# Patient Record
Sex: FEMALE | Race: BLACK OR AFRICAN AMERICAN | Employment: FULL TIME | URBAN - METROPOLITAN AREA
[De-identification: names, ages, dates, MRNs, and addresses within clinical notes are randomized per-mention and may not be internally consistent; named-entity substitution may affect disease eponyms.]

---

## 2020-10-04 ENCOUNTER — HOSPITAL ENCOUNTER (INPATIENT)
Age: 30
LOS: 2 days | Discharge: HOME | DRG: 812 | End: 2020-10-06
Attending: EMERGENCY MEDICINE | Admitting: INTERNAL MEDICINE

## 2020-10-04 ENCOUNTER — APPOINTMENT (OUTPATIENT)
Dept: CT IMAGING | Age: 30
DRG: 812 | End: 2020-10-04

## 2020-10-04 PROBLEM — D64.9 ACUTE ANEMIA: Status: ACTIVE | Noted: 2020-10-04

## 2020-10-04 LAB
A/G RATIO: 1.7 (ref 1.1–2.2)
ALBUMIN SERPL-MCNC: 4.3 G/DL (ref 3.4–5)
ALP BLD-CCNC: 52 U/L (ref 40–129)
ALT SERPL-CCNC: 7 U/L (ref 10–40)
AMORPHOUS: ABNORMAL /HPF
ANION GAP SERPL CALCULATED.3IONS-SCNC: 10 MMOL/L (ref 3–16)
ANISOCYTOSIS: ABNORMAL
AST SERPL-CCNC: 13 U/L (ref 15–37)
BACTERIA: ABNORMAL /HPF
BASOPHILS ABSOLUTE: 0 K/UL (ref 0–0.2)
BASOPHILS RELATIVE PERCENT: 0 %
BILIRUB SERPL-MCNC: <0.2 MG/DL (ref 0–1)
BILIRUBIN URINE: NEGATIVE
BLOOD, URINE: ABNORMAL
BUN BLDV-MCNC: 13 MG/DL (ref 7–20)
CALCIUM SERPL-MCNC: 8.8 MG/DL (ref 8.3–10.6)
CHLORIDE BLD-SCNC: 105 MMOL/L (ref 99–110)
CLARITY: ABNORMAL
CO2: 22 MMOL/L (ref 21–32)
COLOR: ABNORMAL
CREAT SERPL-MCNC: 0.8 MG/DL (ref 0.6–1.1)
EOSINOPHILS ABSOLUTE: 0 K/UL (ref 0–0.6)
EOSINOPHILS RELATIVE PERCENT: 0 %
EPITHELIAL CELLS, UA: ABNORMAL /HPF (ref 0–5)
GFR AFRICAN AMERICAN: >60
GFR NON-AFRICAN AMERICAN: >60
GLOBULIN: 2.5 G/DL
GLUCOSE BLD-MCNC: 99 MG/DL (ref 70–99)
GLUCOSE URINE: NEGATIVE MG/DL
HCG(URINE) PREGNANCY TEST: NEGATIVE
HCT VFR BLD CALC: 16 % (ref 36–48)
HCT VFR BLD CALC: 16.2 % (ref 36–48)
HEMATOLOGY PATH CONSULT: YES
HEMOGLOBIN: 4.9 G/DL (ref 12–16)
HEMOGLOBIN: 4.9 G/DL (ref 12–16)
HYPOCHROMIA: ABNORMAL
KETONES, URINE: NEGATIVE MG/DL
LEUKOCYTE ESTERASE, URINE: ABNORMAL
LYMPHOCYTES ABSOLUTE: 2.1 K/UL (ref 1–5.1)
LYMPHOCYTES RELATIVE PERCENT: 29 %
MACROCYTES: ABNORMAL
MCH RBC QN AUTO: 19.8 PG (ref 26–34)
MCHC RBC AUTO-ENTMCNC: 30.1 G/DL (ref 31–36)
MCV RBC AUTO: 65.9 FL (ref 80–100)
MICROCYTES: ABNORMAL
MICROSCOPIC EXAMINATION: YES
MONOCYTES ABSOLUTE: 0.6 K/UL (ref 0–1.3)
MONOCYTES RELATIVE PERCENT: 8 %
NEUTROPHILS ABSOLUTE: 4.7 K/UL (ref 1.7–7.7)
NEUTROPHILS RELATIVE PERCENT: 63 %
NITRITE, URINE: POSITIVE
PDW BLD-RTO: 26.1 % (ref 12.4–15.4)
PH UA: 5 (ref 5–8)
PLATELET # BLD: 326 K/UL (ref 135–450)
PLATELET SLIDE REVIEW: ADEQUATE
PMV BLD AUTO: 9.2 FL (ref 5–10.5)
POIKILOCYTES: ABNORMAL
POTASSIUM REFLEX MAGNESIUM: 3.9 MMOL/L (ref 3.5–5.1)
PROTEIN UA: 100 MG/DL
RBC # BLD: 2.45 M/UL (ref 4–5.2)
RBC UA: >100 /HPF (ref 0–4)
SCHISTOCYTES: ABNORMAL
SLIDE REVIEW: ABNORMAL
SODIUM BLD-SCNC: 137 MMOL/L (ref 136–145)
SPECIFIC GRAVITY UA: 1.01 (ref 1–1.03)
STOMATOCYTES: ABNORMAL
TARGET CELLS: ABNORMAL
TOTAL PROTEIN: 6.8 G/DL (ref 6.4–8.2)
URINE REFLEX TO CULTURE: YES
URINE TYPE: ABNORMAL
UROBILINOGEN, URINE: 0.2 E.U./DL
WBC # BLD: 7.4 K/UL (ref 4–11)
WBC UA: ABNORMAL /HPF (ref 0–5)

## 2020-10-04 PROCEDURE — 84703 CHORIONIC GONADOTROPIN ASSAY: CPT

## 2020-10-04 PROCEDURE — 82728 ASSAY OF FERRITIN: CPT

## 2020-10-04 PROCEDURE — 87086 URINE CULTURE/COLONY COUNT: CPT

## 2020-10-04 PROCEDURE — 85014 HEMATOCRIT: CPT

## 2020-10-04 PROCEDURE — 86923 COMPATIBILITY TEST ELECTRIC: CPT

## 2020-10-04 PROCEDURE — 86850 RBC ANTIBODY SCREEN: CPT

## 2020-10-04 PROCEDURE — 84443 ASSAY THYROID STIM HORMONE: CPT

## 2020-10-04 PROCEDURE — 86901 BLOOD TYPING SEROLOGIC RH(D): CPT

## 2020-10-04 PROCEDURE — 85025 COMPLETE CBC W/AUTO DIFF WBC: CPT

## 2020-10-04 PROCEDURE — 83540 ASSAY OF IRON: CPT

## 2020-10-04 PROCEDURE — 81001 URINALYSIS AUTO W/SCOPE: CPT

## 2020-10-04 PROCEDURE — 1200000000 HC SEMI PRIVATE

## 2020-10-04 PROCEDURE — 85018 HEMOGLOBIN: CPT

## 2020-10-04 PROCEDURE — 36430 TRANSFUSION BLD/BLD COMPNT: CPT

## 2020-10-04 PROCEDURE — 74176 CT ABD & PELVIS W/O CONTRAST: CPT

## 2020-10-04 PROCEDURE — 83550 IRON BINDING TEST: CPT

## 2020-10-04 PROCEDURE — 86900 BLOOD TYPING SEROLOGIC ABO: CPT

## 2020-10-04 PROCEDURE — 2580000003 HC RX 258: Performed by: EMERGENCY MEDICINE

## 2020-10-04 PROCEDURE — 36415 COLL VENOUS BLD VENIPUNCTURE: CPT

## 2020-10-04 PROCEDURE — 99285 EMERGENCY DEPT VISIT HI MDM: CPT

## 2020-10-04 PROCEDURE — 93005 ELECTROCARDIOGRAM TRACING: CPT | Performed by: EMERGENCY MEDICINE

## 2020-10-04 PROCEDURE — 6360000002 HC RX W HCPCS: Performed by: EMERGENCY MEDICINE

## 2020-10-04 PROCEDURE — 80053 COMPREHEN METABOLIC PANEL: CPT

## 2020-10-04 PROCEDURE — P9016 RBC LEUKOCYTES REDUCED: HCPCS

## 2020-10-04 RX ORDER — POLYETHYLENE GLYCOL 3350 17 G/17G
17 POWDER, FOR SOLUTION ORAL DAILY PRN
Status: DISCONTINUED | OUTPATIENT
Start: 2020-10-04 | End: 2020-10-06 | Stop reason: HOSPADM

## 2020-10-04 RX ORDER — 0.9 % SODIUM CHLORIDE 0.9 %
250 INTRAVENOUS SOLUTION INTRAVENOUS ONCE
Status: COMPLETED | OUTPATIENT
Start: 2020-10-04 | End: 2020-10-05

## 2020-10-04 RX ORDER — SODIUM CHLORIDE 9 MG/ML
INJECTION, SOLUTION INTRAVENOUS CONTINUOUS
Status: ACTIVE | OUTPATIENT
Start: 2020-10-04 | End: 2020-10-05

## 2020-10-04 RX ORDER — SODIUM CHLORIDE 0.9 % (FLUSH) 0.9 %
10 SYRINGE (ML) INJECTION EVERY 12 HOURS SCHEDULED
Status: DISCONTINUED | OUTPATIENT
Start: 2020-10-04 | End: 2020-10-06 | Stop reason: HOSPADM

## 2020-10-04 RX ORDER — SODIUM CHLORIDE 0.9 % (FLUSH) 0.9 %
10 SYRINGE (ML) INJECTION PRN
Status: DISCONTINUED | OUTPATIENT
Start: 2020-10-04 | End: 2020-10-06 | Stop reason: HOSPADM

## 2020-10-04 RX ORDER — ONDANSETRON 2 MG/ML
4 INJECTION INTRAMUSCULAR; INTRAVENOUS EVERY 6 HOURS PRN
Status: DISCONTINUED | OUTPATIENT
Start: 2020-10-04 | End: 2020-10-06 | Stop reason: HOSPADM

## 2020-10-04 RX ORDER — ACETAMINOPHEN 325 MG/1
650 TABLET ORAL EVERY 6 HOURS PRN
Status: DISCONTINUED | OUTPATIENT
Start: 2020-10-04 | End: 2020-10-06 | Stop reason: HOSPADM

## 2020-10-04 RX ORDER — ACETAMINOPHEN 650 MG/1
650 SUPPOSITORY RECTAL EVERY 6 HOURS PRN
Status: DISCONTINUED | OUTPATIENT
Start: 2020-10-04 | End: 2020-10-06 | Stop reason: HOSPADM

## 2020-10-04 RX ORDER — 0.9 % SODIUM CHLORIDE 0.9 %
1000 INTRAVENOUS SOLUTION INTRAVENOUS ONCE
Status: COMPLETED | OUTPATIENT
Start: 2020-10-04 | End: 2020-10-04

## 2020-10-04 RX ORDER — PROMETHAZINE HYDROCHLORIDE 12.5 MG/1
12.5 TABLET ORAL EVERY 6 HOURS PRN
Status: DISCONTINUED | OUTPATIENT
Start: 2020-10-04 | End: 2020-10-06 | Stop reason: HOSPADM

## 2020-10-04 RX ADMIN — SODIUM CHLORIDE 1000 ML: 0.9 INJECTION, SOLUTION INTRAVENOUS at 19:30

## 2020-10-04 RX ADMIN — DEXTROSE MONOHYDRATE 1 G: 5 INJECTION INTRAVENOUS at 21:15

## 2020-10-04 ASSESSMENT — ENCOUNTER SYMPTOMS
WHEEZING: 0
STRIDOR: 0
TROUBLE SWALLOWING: 0
ABDOMINAL PAIN: 0
VOICE CHANGE: 0
NAUSEA: 0
COLOR CHANGE: 0
VOMITING: 0
FACIAL SWELLING: 0
SHORTNESS OF BREATH: 0

## 2020-10-04 ASSESSMENT — PAIN SCALES - GENERAL: PAINLEVEL_OUTOF10: 0

## 2020-10-04 ASSESSMENT — PAIN DESCRIPTION - LOCATION: LOCATION: BACK

## 2020-10-04 ASSESSMENT — PAIN DESCRIPTION - ORIENTATION: ORIENTATION: RIGHT;LEFT;LOWER;MID

## 2020-10-04 NOTE — ED PROVIDER NOTES
[address] [facility]  eMERGENCY dEPARTMENT eNCOUnter      Pt Name: Selina Casey  MRN: [id number]  [place] 10/26/1992  Date of evaluation: 10/4/2020  Provider: Billie Samuels MD    CHIEF COMPLAINT       Chief Complaint   Patient presents with    Dizziness     c/o Feeling dizzy all day at work. States usually they leave but this one is sticking around. Has been happening for past week.  was injured at work awhile ago. Was seen at Urgent Care, was treated with ABX but states now is urinating blood. HISTORY OF PRESENT ILLNESS   (Location/Symptom, Timing/Onset, Context/Setting, Quality, Duration, Modifying Factors, Severity)  Note limiting factors. Selina Casey is a 32 y.o. female denies any known medical conditions and not taking any medications who presents due to lightheadedness intermittently every day for the past week. 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 She reports prior to today she would feel very lightheaded and would have decreased vision and have to sit down when she went long periods without eating. She reports that prior to today when she did eat it significantly helped her symptoms. However today she had the same symptoms but when she ate it did not help her symptoms so she decided to come to the emergency department for further evaluation as her symptoms were not relieved. She reports the a couple weeks ago she was cooking a heavy object at work and ever since then has been peeing blood. She also reports some right flank pain. Pain is intermittent and waxes and wanes. Sports lifting moving worsens the pain and nothing improves it. She reports she was treated with an antibiotic for this but does not know what it was. She reports her symptoms are severe, intermittent, and wax and wane.   She denies any vertigo or room spinning, loss of consciousness, vomiting, confusion, or altered mental Drug use: Not on file    Sexual activity: Not on file   Lifestyle    Physical activity     Days per week: Not on file     Minutes per session: Not on file    Stress: Not on file   Relationships    Social connections     Talks on phone: Not on file     Gets together: Not on file     Attends Alevism service: Not on file     Active member of club or organization: Not on file     Attends meetings of clubs or organizations: Not on file     Relationship status: Not on file    Intimate partner violence     Fear of current or ex partner: Not on file     Emotionally abused: Not on file     Physically abused: Not on file     Forced sexual activity: Not on file   Other Topics Concern    Not on file   Social History Narrative    Not on file         PHYSICAL EXAM    (up to 7 for level 4, 8 or more for level 5)     ED Triage Vitals [10/04/20 1907]   BP Temp Temp Source Pulse Resp SpO2 Height Weight   116/68 98.7 °F (37.1 °C) Oral 90 14 100 % 5' 9\" (1.753 m) 187 lb 4.8 oz (85 kg)       Physical Exam  Vitals signs and nursing note reviewed. Constitutional:       Appearance: She is well-developed. She is not diaphoretic. Comments: Pleasant and cooperative. Nontoxic-appearing. In no acute distress. HENT:      Head: Normocephalic and atraumatic. Right Ear: External ear normal.      Left Ear: External ear normal.   Eyes:      Extraocular Movements: Extraocular movements intact. Conjunctiva/sclera: Conjunctivae normal.      Pupils: Pupils are equal, round, and reactive to light. Neck:      Musculoskeletal: Neck supple. Vascular: No JVD. Trachea: No tracheal deviation. Cardiovascular:      Rate and Rhythm: Normal rate. Pulmonary:      Effort: Pulmonary effort is normal. No respiratory distress. Breath sounds: Normal breath sounds. No wheezing. Abdominal:      General: There is no distension. Palpations: Abdomen is soft. Tenderness: There is no abdominal tenderness.  There is right CVA tenderness. There is no left CVA tenderness, guarding or rebound. Comments: Abdomen soft. No anterior abdominal tenderness to palpation. Mild right-sided CVA tenderness to percussion. Musculoskeletal: Normal range of motion. General: No tenderness or deformity. Skin:     General: Skin is warm and dry. Neurological:      General: No focal deficit present. Mental Status: She is alert and oriented to person, place, and time. Cranial Nerves: No cranial nerve deficit. Comments: Normal speech. Normal mental status. Sensation intact in all CN V distributions of the face bilaterally. No facial droop. 5/5 strength in all 4 extremities. Normal gait. Normal sensation equal in all 4 extremities. Negative romberg. Normal finger to nose and heel to shin. DIAGNOSTIC RESULTS     EKG:All EKG's are interpreted by the Emergency Department Physician who either signs or Co-signs this chart in the absence of a cardiologist.    The Ekg interpreted by me shows  normal sinus rhythm with a rate of 83  Axis is   Normal  QTc is  432ms  Intervals and Durations are unremarkable. ST Segments: normal  No previous EKG available for comparison      RADIOLOGY:     Interpretation per the Radiologist below, if available at the time of this note:    CT ABDOMEN PELVIS WO CONTRAST Additional Contrast? None   Final Result      1. Small fluid lower pelvis which may reflect physiologic fluid or may reflect a ruptured ovarian cyst in the proper clinical setting. 2. No ureterolithiasis or hydronephrosis. 3. Small fat-containing umbilical hernia.                   ED BEDSIDE ULTRASOUND:   Performed by ED Physician - none    LABS:  Labs Reviewed   URINE RT REFLEX TO CULTURE - Abnormal; Notable for the following components:       Result Value    Color, UA RED (*)     Clarity, UA CLOUDY (*)     Blood, Urine LARGE (*)     Protein,  (*)     Nitrite, Urine POSITIVE (*)     Leukocyte Esterase, Urine TRACE (*)     All other components within normal limits    Narrative:     Performed at:  [facility]  [facility]   Phone [phone]   MICROSCOPIC URINALYSIS - Abnormal; Notable for the following components:    WBC, UA 10-20 (*)     RBC, UA >100 (*)     Epithelial Cells, UA 6-10 (*)     Bacteria, UA 3+ (*)     All other components within normal limits    Narrative:     Performed at:  [facility]  [facility] [place] [address]   Phone [phone]   CBC WITH AUTO DIFFERENTIAL - Abnormal; Notable for the following components:    RBC 2.45 (*)     Hemoglobin 4.9 (*)     Hematocrit 16.2 (*)     MCV 65.9 (*)     MCH 19.8 (*)     MCHC 30.1 (*)     RDW 26.1 (*)     All other components within normal limits    Narrative:     Donald Diana tel. [phone],  Hematology results called to and read back by Onur Patel RN, 10/04/2020  20:22, by [facility]  Performed at:  [facility]  [facility] [place]   Phone [phone] METABOLIC PANEL W/ REFLEX TO MG FOR LOW K - Abnormal; Notable for the following components:    ALT 7 (*)     AST 13 (*)     All other components within normal limits    Narrative:     Performed at:  [facility]  [facility] [place]   Phone [phone]   CULTURE, URINE   PREGNANCY, URINE    Narrative:     Performed at:  [facility] [place]   Phone [phone]       All otherlabs were within normal range or not returned as of this dictation.     EMERGENCY DEPARTMENT COURSE and DIFFERENTIAL DIAGNOSIS/MDM:   Vitals:    Vitals:    10/04/20 1907 10/04/20 2015   BP: 116/68    Pulse: 90 92   Resp: 14 14   Temp: 98.7 °F (37.1 °C)    TempSrc: Oral    SpO2: 100%    Weight: 187 lb 4.8 oz studies, discussions with consultants, ordering and review of radiographic studies, re-evaluation of patient's condition, examination of patient and obtaining history from patient or surrogate        FINAL IMPRESSION      1. Anemia, unspecified type    2. Lightheadedness          DISPOSITION/PLAN   DISPOSITION  Admit           (Please note that portions of this note were completed with a voice recognition program.  Efforts were made to edit the dictations but occasionally words aremis-transcribed. )    Demarco Rajan MD (electronically signed)  Attending Emergency Physician           Demarco Rajan MD  10/04/20 9157

## 2020-10-05 ENCOUNTER — APPOINTMENT (OUTPATIENT)
Dept: CT IMAGING | Age: 30
DRG: 812 | End: 2020-10-05

## 2020-10-05 LAB
ALBUMIN SERPL-MCNC: 3.6 G/DL (ref 3.4–5)
ANION GAP SERPL CALCULATED.3IONS-SCNC: 7 MMOL/L (ref 3–16)
APTT: 28.3 SEC (ref 24.2–36.2)
BLOOD BANK DISPENSE STATUS: NORMAL
BLOOD BANK DISPENSE STATUS: NORMAL
BLOOD BANK PRODUCT CODE: NORMAL
BLOOD BANK PRODUCT CODE: NORMAL
BPU ID: NORMAL
BPU ID: NORMAL
BUN BLDV-MCNC: 9 MG/DL (ref 7–20)
C3 COMPLEMENT: 108.7 MG/DL (ref 90–180)
C4 COMPLEMENT: 23.8 MG/DL (ref 10–40)
CALCIUM SERPL-MCNC: 8.2 MG/DL (ref 8.3–10.6)
CHLORIDE BLD-SCNC: 110 MMOL/L (ref 99–110)
CO2: 22 MMOL/L (ref 21–32)
CREAT SERPL-MCNC: 0.7 MG/DL (ref 0.6–1.1)
DESCRIPTION BLOOD BANK: NORMAL
DESCRIPTION BLOOD BANK: NORMAL
EKG ATRIAL RATE: 83 BPM
EKG DIAGNOSIS: NORMAL
EKG P AXIS: 68 DEGREES
EKG P-R INTERVAL: 196 MS
EKG Q-T INTERVAL: 368 MS
EKG QRS DURATION: 74 MS
EKG QTC CALCULATION (BAZETT): 432 MS
EKG R AXIS: 67 DEGREES
EKG T AXIS: 52 DEGREES
EKG VENTRICULAR RATE: 83 BPM
FERRITIN: 5.1 NG/ML (ref 15–150)
GFR AFRICAN AMERICAN: >60
GFR NON-AFRICAN AMERICAN: >60
GLUCOSE BLD-MCNC: 101 MG/DL (ref 70–99)
HCT VFR BLD CALC: 20.9 % (ref 36–48)
HCT VFR BLD CALC: 24.7 % (ref 36–48)
HEMATOLOGY PATH CONSULT: NORMAL
HEMOGLOBIN: 6.3 G/DL (ref 12–16)
HEMOGLOBIN: 8 G/DL (ref 12–16)
INR BLD: 1.03 (ref 0.86–1.14)
IRON SATURATION: 2 % (ref 15–50)
IRON: 9 UG/DL (ref 37–145)
MAGNESIUM: 2.1 MG/DL (ref 1.8–2.4)
POTASSIUM REFLEX MAGNESIUM: 4 MMOL/L (ref 3.5–5.1)
PROTHROMBIN TIME: 12 SEC (ref 10–13.2)
SODIUM BLD-SCNC: 139 MMOL/L (ref 136–145)
TOTAL IRON BINDING CAPACITY: 367 UG/DL (ref 260–445)
TSH REFLEX: 2.17 UIU/ML (ref 0.27–4.2)

## 2020-10-05 PROCEDURE — 2580000003 HC RX 258: Performed by: STUDENT IN AN ORGANIZED HEALTH CARE EDUCATION/TRAINING PROGRAM

## 2020-10-05 PROCEDURE — 36415 COLL VENOUS BLD VENIPUNCTURE: CPT

## 2020-10-05 PROCEDURE — 6360000002 HC RX W HCPCS: Performed by: STUDENT IN AN ORGANIZED HEALTH CARE EDUCATION/TRAINING PROGRAM

## 2020-10-05 PROCEDURE — 80048 BASIC METABOLIC PNL TOTAL CA: CPT

## 2020-10-05 PROCEDURE — 85014 HEMATOCRIT: CPT

## 2020-10-05 PROCEDURE — 85730 THROMBOPLASTIN TIME PARTIAL: CPT

## 2020-10-05 PROCEDURE — 86160 COMPLEMENT ANTIGEN: CPT

## 2020-10-05 PROCEDURE — 1200000000 HC SEMI PRIVATE

## 2020-10-05 PROCEDURE — 82040 ASSAY OF SERUM ALBUMIN: CPT

## 2020-10-05 PROCEDURE — 2580000003 HC RX 258: Performed by: INTERNAL MEDICINE

## 2020-10-05 PROCEDURE — 93010 ELECTROCARDIOGRAM REPORT: CPT | Performed by: INTERNAL MEDICINE

## 2020-10-05 PROCEDURE — 85018 HEMOGLOBIN: CPT

## 2020-10-05 PROCEDURE — 74178 CT ABD&PLV WO CNTR FLWD CNTR: CPT

## 2020-10-05 PROCEDURE — 6360000004 HC RX CONTRAST MEDICATION: Performed by: PHYSICIAN ASSISTANT

## 2020-10-05 PROCEDURE — 85610 PROTHROMBIN TIME: CPT

## 2020-10-05 PROCEDURE — 83735 ASSAY OF MAGNESIUM: CPT

## 2020-10-05 RX ORDER — SODIUM CHLORIDE, SODIUM LACTATE, POTASSIUM CHLORIDE, CALCIUM CHLORIDE 600; 310; 30; 20 MG/100ML; MG/100ML; MG/100ML; MG/100ML
INJECTION, SOLUTION INTRAVENOUS CONTINUOUS
Status: DISCONTINUED | OUTPATIENT
Start: 2020-10-05 | End: 2020-10-06

## 2020-10-05 RX ADMIN — Medication 10 ML: at 00:25

## 2020-10-05 RX ADMIN — IRON SUCROSE 300 MG: 20 INJECTION, SOLUTION INTRAVENOUS at 14:10

## 2020-10-05 RX ADMIN — SODIUM CHLORIDE, POTASSIUM CHLORIDE, SODIUM LACTATE AND CALCIUM CHLORIDE: 600; 310; 30; 20 INJECTION, SOLUTION INTRAVENOUS at 17:29

## 2020-10-05 RX ADMIN — CEFTRIAXONE 1 G: 1 INJECTION, POWDER, FOR SOLUTION INTRAMUSCULAR; INTRAVENOUS at 21:00

## 2020-10-05 RX ADMIN — SODIUM CHLORIDE: 9 INJECTION, SOLUTION INTRAVENOUS at 00:23

## 2020-10-05 RX ADMIN — SODIUM CHLORIDE 250 ML: 9 INJECTION, SOLUTION INTRAVENOUS at 00:25

## 2020-10-05 RX ADMIN — Medication 10 ML: at 21:00

## 2020-10-05 RX ADMIN — Medication 10 ML: at 08:31

## 2020-10-05 RX ADMIN — IOPAMIDOL 80 ML: 755 INJECTION, SOLUTION INTRAVENOUS at 16:54

## 2020-10-05 ASSESSMENT — ENCOUNTER SYMPTOMS
SHORTNESS OF BREATH: 0
COUGH: 0
CHEST TIGHTNESS: 0
ANAL BLEEDING: 0
BACK PAIN: 1
BLOOD IN STOOL: 0
NAUSEA: 0
ABDOMINAL DISTENTION: 0
WHEEZING: 0
DIARRHEA: 0
ABDOMINAL PAIN: 0
VOMITING: 0
CONSTIPATION: 0

## 2020-10-05 ASSESSMENT — PAIN SCALES - GENERAL: PAINLEVEL_OUTOF10: 0

## 2020-10-05 NOTE — PROGRESS NOTES
Pt arrived from [facility] in stable condition. She is alert and oriented; was able to walk from stretcher to bed. Admits dizziness and weakness. Fall/safety precautions put in place and explained to patient. Pt will call, as appropriate, using call light monitor when she needs to get out of bed. Bed alarm set. Pt oriented to room and routines. Verbalizes understanding.

## 2020-10-05 NOTE — ED NOTES
Report given to RN at [facility];  Pt en route via [facility]     Elfego Mo RN  10/04/20 5938

## 2020-10-05 NOTE — PROGRESS NOTES
Blood unit came from blood bank with incorrect patient labelers on it. It would not agree with Epic, kept giving us warning: this is not the correct patient/unit. Had to be sent back to be relabeled.

## 2020-10-05 NOTE — PROGRESS NOTES
Progress Note    Admit Date: 10/4/2020  Diet: DIET GENERAL;  Diet NPO, After Midnight Exceptions are: Sips with Meds, Ice Chips    CC: Hematuria     Interval history:   Patient feels well this morning. Experiencing fatigue, dizziness. States she continues to crave ice. Denies fever/chills overnight. Denies dark stools, blood in stool, nausea/vomiting/diarrhea/constipation. Denies abdominal pain. Denies foul smelling urine or discharge from urethra or vagina. Of note, patient mentions she has sickle cell trait. Patient denies family history of cancers, specifically bladder cancer. Denies history of glomerular disorders, kidney disease in family. Denies history of autoimmune illnesses in family. Denies viral infection around time of symptom onset. Medications:     Scheduled Meds:   cefTRIAXone (ROCEPHIN) IV  1 g Intravenous Q24H    sodium chloride flush  10 mL Intravenous 2 times per day     Continuous Infusions:   lactated ringers       PRN Meds:sodium chloride flush, acetaminophen **OR** acetaminophen, polyethylene glycol, promethazine **OR** ondansetron    Objective:   Vitals:   T-max:  Patient Vitals for the past 8 hrs:   BP Temp Temp src Pulse Resp SpO2   10/05/20 1423 103/65 98.5 °F (36.9 °C) Oral 78 18 100 %   10/05/20 1135 111/78 98.4 °F (36.9 °C) Oral 91 16 96 %   10/05/20 1002 91/60 98.3 °F (36.8 °C) Oral 82 16 95 %   10/05/20 0946 (!) 95/59 98.5 °F (36.9 °C) -- 80 16 --   10/05/20 0829 (!) 85/57 98.3 °F (36.8 °C) Oral 82 16 96 %       Intake/Output Summary (Last 24 hours) at 10/5/2020 1542  Last data filed at 10/5/2020 1002  Gross per 24 hour   Intake 125 ml   Output --   Net 125 ml       Review of Systems   Constitutional: Positive for fatigue. HENT: Negative for nosebleeds. Respiratory: Negative for chest tightness and shortness of breath. Cardiovascular: Negative for chest pain and palpitations.    Gastrointestinal: Negative for abdominal distention, abdominal pain, anal bleeding, blood in stool, constipation, diarrhea, nausea and vomiting. Genitourinary: Positive for hematuria. Negative for difficulty urinating, dyspareunia, dysuria, menstrual problem, vaginal bleeding and vaginal discharge. Musculoskeletal: Positive for back pain. Neurological: Positive for dizziness and light-headedness. Hematological: Does not bruise/bleed easily. Physical Exam  Constitutional:       Appearance: Normal appearance. HENT:      Head: Normocephalic and atraumatic. Mouth/Throat:      Mouth: Mucous membranes are moist.   Cardiovascular:      Rate and Rhythm: Normal rate and regular rhythm. Pulmonary:      Effort: Pulmonary effort is normal. No respiratory distress. Breath sounds: Normal breath sounds. Abdominal:      General: Abdomen is flat. Bowel sounds are normal. There is no distension. Palpations: Abdomen is soft. There is no mass. Tenderness: There is no abdominal tenderness. There is no guarding. Neurological:      Mental Status: She is alert. LABS:    CBC:   Recent Labs     10/04/20  1920 10/04/20  2328 10/05/20  0844   WBC 7.4  --   --    HGB 4.9* 4.9* 6.3*   HCT 16.2* 16.0* 20.9*     --   --    MCV 65.9*  --   --      Renal:    Recent Labs     10/04/20  1920 10/05/20  0527 10/05/20  0955    139  --    K 3.9 4.0  --     110  --    CO2 22 22  --    BUN 13 9  --    CREATININE 0.8 0.7  --    GLUCOSE 99 101*  --    CALCIUM 8.8 8.2*  --    MG  --   --  2.10   ANIONGAP 10 7  --      Hepatic:   Recent Labs     10/04/20  1920 10/05/20  0955   AST 13*  --    ALT 7*  --    BILITOT <0.2  --    PROT 6.8  --    LABALBU 4.3 3.6   ALKPHOS 52  --      Troponin: No results for input(s): TROPONINI in the last 72 hours. BNP: No results for input(s): BNP in the last 72 hours. Lipids: No results for input(s): CHOL, HDL in the last 72 hours.     Invalid input(s): LDLCALCU, TRIGLYCERIDE  ABGs:  No results for input(s): PHART, GUI8OWL, PO2ART, HGR3URF, BEART, THGBART, L6DPOJBH, ZXI1EXD in the last 72 hours. INR:   Recent Labs     10/05/20  0955   INR 1.03     Lactate: No results for input(s): LACTATE in the last 72 hours. Cultures:  -----------------------------------------------------------------  RAD:   CT ABDOMEN PELVIS WO CONTRAST Additional Contrast? None   Final Result      1. Small fluid lower pelvis which may reflect physiologic fluid or may reflect a ruptured ovarian cyst in the proper clinical setting. 2. No ureterolithiasis or hydronephrosis. 3. Small fat-containing umbilical hernia. CT ABDOMEN PELVIS W WO CONTRAST Additional Contrast? None    (Results Pending)       Assessment/Plan:     Ms. Marshall Lee is a 34 y.o F with no known history, p/w 4 months of gross painless hematuria. She currently has the following issues:     Gross Hematuria of Unknown Etiology  Possible Bladder Etiology vs Possible Glomerular Disease vs Possible Autoimmune   vs Possible Sickle Cell Trait   Patient with sickle cell trait. UA with large blood, >100 RBC. Hgb 4.9 on admission. Hematuria and fatigue for past 4 months. CT abdomen with possible ruptured cyst vs. physiologic changes. CT negative for malignancy. PT, INR- WNL. - Urology consulted - appreciate reccs   - CT urogram tomorrow  - NPO at midnight for possible cysto tomorrow; dependent on CT uro findings and Hgb    - LR at 100 ml/hr  - F/u C3, C4    Microcytic Anemia    Probable Hematuria vs Probable Iron Deficiency vs Possible Sickle Cell Trait   vs Possible GI Blood Loss   Patient with hematuria for 4 months. Low MCV (65.9), iron (9), ferritin suggestive of a microcytic iron-deficiency anemia. Patient with pica. Blood smear showed marked microcytic, hypochromic anemia.    - Ordered 2 U pRBC, will monitor H/H q8h  - Continue Venofer  - Transfuse for Hgb<7  - F/u FOBT     Acute UTI, possibly complicated  Associated with marked fatigue per patient. Ruled out sepsis 0/4 SIRS criteria.  UA with positive leukocyte esterase and nitrites, 3+ bacteria, increased WBC's.  Patient denies dysuria, increased urinary frequency, urethral discharge, foul-smelling urine.   - Continue Rocephin   - Continue LR at 100 mL/h  - F/u urine culture        Code Status: Full Code  FEN: LR at 100 mL/h, general diet, NPO midnight   PPX: None  DISPO: F for further workup of hematuria     Guillermo Mackenzie, MS4  Gayathri John, PGY-1  10/05/20  3:42 PM    This patient has been staffed and discussed with Bella Ward MD.

## 2020-10-05 NOTE — PROGRESS NOTES
Pt tolerated Blood transfusion well with no issues/concerns. Once completed Ct called wanting to put in order for transport and requested that IVF be unhooked for scan. Nurse stated understanding. Will start LD when pt returns.  Electronically signed by Vijaya Vazquez RN on 10/5/2020 at 1:21 PM

## 2020-10-05 NOTE — CARE COORDINATION
Case Management Assessment           Initial Evaluation                Date / Time of Evaluation: 10/5/2020 11:47 AM                 Assessment Completed by: Chintan Correa    Patient Name: Roxy Ceballos     YOB: 1990  Diagnosis: Acute anemia [D64.9]     Date / Time: 10/4/2020  7:13 PM    Patient Admission Status: Inpatient    If patient is discharged prior to next notation, then this note serves as note for discharge by case management. Current PCP: No primary care provider on file. Clinic Patient: No    Chart Reviewed: Yes  Patient/ Family Interviewed: Yes    Initial assessment completed at bedside with: f2f with patient    Hospitalization in the last 30 days: No    Emergency Contacts:  Extended Emergency Contact Information  Primary Emergency Contact: one, no  Home Phone: [phone]  Relation: Other    Advance Directives:   Code Status: Full Code    Healthcare Power of : No  Agent: na  Contact Number: na    Copy present: No     In paper Chart: No    Scanned into EMR No    Financial  Payor: /     Pre-cert required for SNF: No    Pharmacy  No Pharmacies Listed    Potential assistance Purchasing Medications:    Does Patient want to participate in local refill/ meds to beds program?:      Meds To Beds General Rules:  1. Can ONLY be done Monday- Friday between 8:30am-5pm  2. Prescription(s) must be in pharmacy by 3pm to be filled same day  3. Copy of patient's insurance/ prescription drug card and patient face sheet must be sent along with the prescription(s)  4. Cost of Rx cannot be added to hospital bill. If financial assistance is needed, please contact unit  or ;  or  CANNOT provide pharmacy voucher for patients co-pays  5.  Patients can then  the prescription on their way out of the hospital at discharge, or pharmacy can deliver to the bedside if staff is available. (payment due at time of pick-up or delivery - cash, check, or card accepted)     Able to afford home medications/ co-pay costs: Yes  May need  voucher for meds to beds at d/c due to no insurance    ADLS  Support Systems:      PT AM-PAC:     OT AM-PAC:       [place]: lives with daughter in single family home  Steps: multiple    Plans to RETURN to current housing: Yes  Barriers to RETURNING to current housin [place]  Currently ACTIVE with  [employer] [address]: No  Home Care Agency: Not Applicable    Currently ACTIVE with [place] on Aging: No  Passport/ Waiver: No  Passport/ Waiver Services: Not Applicable    Durable Medical Equipment  DME Provider: NA  Equipment: NA    Home Oxygen and [address] [place] prior to admission: No  [address]: Not Applicable  Other Respiratory Equipment: NA    Informed of need to bring portable home O2 tank on day of DISCHARGE for nursing to connect prior to leaving: No  Verbalized agreement/Understanding: No  Person to bring portable tank at discharge: NA    Dialysis  Active with HD/PD prior to admission: No  Nephrologist: Nolberto Julien 61:  Not Applicable    DISCHARGE PLAN:  Disposition: Home- No Services Needed    Transportation PLAN for discharge: family     Factors facilitating achievement of predicted outcomes: Family support    Barriers to discharge: stable H/H, needs PCP, financial couseling    Additional Case Management Notes:   Lives in single family home with 3 y/o daughter. Daughter staying with friend. Dx of anemia, lightheadedness. Received transfusion. Order for CT urogram and possible cystoscopy. Needs financial services and provide resources for PCP. Patient requesting to discharge.           The Plan for Transition of Care is related to the following treatment goals of Acute anemia [D64.9]    The Patient and/or patient representative Cristina and her family were provided with a choice of provider and agrees with the discharge plan Yes    Freedom of choice list was provided with basic dialogue that supports the patient's individualized plan of care/goals and shares the quality data associated with the providers.  Yes    Care Transition patient: No    Stephen Moon RN  [facility]  Case Management Department  Ph: [phone]

## 2020-10-05 NOTE — PROGRESS NOTES
Removed IV to right hand due to pt stated sore. IV to right upper arm has LR running at this time. PT stated IV was sore upon flush however stated after initial flush soreness was gone and denied issues/concerns with IV at this time.

## 2020-10-05 NOTE — PLAN OF CARE
Problem: Falls - Risk of:  Goal: Will remain free from falls  Description: Will remain free from falls  10/5/2020 0956 by Neftali Thorne RN  Outcome: Met This Shift     Problem: Bleeding:  Goal: Will show no signs and symptoms of excessive bleeding  Description: Will show no signs and symptoms of excessive bleeding  10/5/2020 0956 by Neftali Thorne RN  Outcome: Ongoing

## 2020-10-05 NOTE — CONSULTS
10/05/2020    LABGLOM >60 10/05/2020     PT/INR:    Lab Results   Component Value Date    PROTIME 12.0 10/05/2020    INR 1.03 10/05/2020     PTT:    Lab Results   Component Value Date    APTT 28.3 10/05/2020   [APTT    Urinalysis: nitrite positive    Urine Culture: pending    Blood Culture:     Antibiotic Therapy:      Imaging:   Impression         1. Small fluid lower pelvis which may reflect physiologic fluid or may reflect a ruptured ovarian cyst in the proper clinical setting. 2. No ureterolithiasis or hydronephrosis. 3. Small fat-containing umbilical hernia. Impression/Plan:     1.  Gross hematuria  -unclear of the cause at this point  -CT was negative but done without contrast  -will order CT urogram  -hgb up to 6.3 after transfusions--cont further transfusions per IM  -cont abx and will f/u on urine culture  -will likely need a cystoscopy but would prefer to hold off until she has been on further abx and her hgb is better  -NPO midnight for possible cysto tomorrow depending on hgb level and CT urogram results    Erich Quintana PA-C

## 2020-10-05 NOTE — PROGRESS NOTES
Report called to Nelson Mcfarlane on [facility]S. Nelson Mcfarlane requested pt be transported in her bed. Transport called for assistance.  Electronically signed by Debbie Clarke RN on 10/5/2020 at 7:28 PM

## 2020-10-05 NOTE — H&P
Internal Medicine  PGY 2  History & Physical      CC Hematuria, dizziness    History Obtained From:  patient    HISTORY OF PRESENT ILLNESS:  34 y.o F with no known history, p/w 4 months of gross painless hematuria. Patient states symptoms began in June after she lifted a 50 lb bag and felt a pop in her abdomen. Since then she has had gross hematuria with clots every time she voids. She has had no changes in her menses, no increased use of pads or noted vaginal bleeding. Over this time patient has been feeling progressively weaker, lightheaded when standing, and has been craving ice which is new for her. Today she felt extremely light headed and started having visual disturbances which prompted her to go to ED. Patient denies any dark stools, blood in stool, n/v/d, abd pain, dysuria. In the ED she was noted to have Hgb of 4.9, vitals were relatively stable. CT with fluid in abdomen, concerning for possible ruptured ovarian cyst.    Past Medical History:    No past medical history on file. Past Surgical History:    No past surgical history on file. Medications Priorto Admission:    No medications prior to admission. Allergies:  Patient has no known allergies. Social History:   · TOBACCO:   has no history on file for tobacco.  · ETOH:   has no history on file for alcohol. · DRUGS : denies  · Patient currently lives with family  ·   Family History:   No family history on file. Review of Systems   Constitutional: Negative for chills, fatigue and fever. HENT: Negative for congestion. Respiratory: Negative for cough, shortness of breath and wheezing. Cardiovascular: Negative for chest pain and palpitations. Gastrointestinal: Negative for abdominal distention, abdominal pain, diarrhea, nausea and vomiting. Genitourinary: Positive for hematuria. Negative for dysuria, flank pain, menstrual problem, pelvic pain, vaginal bleeding and vaginal pain.    Neurological: Negative for dizziness, syncope, weakness, light-headedness and numbness. ROS: A 10 point review of systems was conducted, significant findings as noted in HPI. Physical Exam  Constitutional:       General: She is not in acute distress. Appearance: She is well-developed. She is not diaphoretic. HENT:      Head: Normocephalic and atraumatic. Cardiovascular:      Rate and Rhythm: Normal rate and regular rhythm. Heart sounds: Normal heart sounds. No murmur. Pulmonary:      Effort: Pulmonary effort is normal. No respiratory distress. Breath sounds: Normal breath sounds. No wheezing. Abdominal:      General: Bowel sounds are normal. There is no distension. Palpations: Abdomen is soft. Tenderness: There is no abdominal tenderness. Skin:     General: Skin is warm and dry. Capillary Refill: Capillary refill takes less than 2 seconds. Findings: No erythema. Neurological:      Mental Status: She is alert and oriented to person, place, and time. Psychiatric:         Behavior: Behavior normal.       Physical exam:       Vitals:    10/04/20 2303   BP: 108/68   Pulse: 88   Resp: 16   Temp: 98.5 °F (36.9 °C)   SpO2:        DATA:    Labs:  CBC:   Recent Labs     10/04/20  1920 10/04/20  2328   WBC 7.4  --    HGB 4.9* 4.9*   HCT 16.2* 16.0*     --        BMP:   Recent Labs     10/04/20  1920      K 3.9      CO2 22   BUN 13   CREATININE 0.8   GLUCOSE 99     LFT's:   Recent Labs     10/04/20  1920   AST 13*   ALT 7*   BILITOT <0.2   ALKPHOS 52     Troponin: No results for input(s): TROPONINI in the last 72 hours. BNP:No results for input(s): BNP in the last 72 hours. ABGs: No results for input(s): PHART, TPM7YOS, PO2ART in the last 72 hours. INR: No results for input(s): INR in the last 72 hours.     U/A:  Recent Labs     10/04/20  1920   COLORU RED*   PHUR 5.0   WBCUA 10-20*   RBCUA >100*   BACTERIA 3+*   CLARITYU CLOUDY*   SPECGRAV 1.015   LEUKOCYTESUR TRACE*   UROBILINOGEN 0.2 BILIRUBINUR Negative   BLOODU LARGE*   GLUCOSEU Negative   AMORPHOUS 2+       CT ABDOMEN PELVIS WO CONTRAST Additional Contrast? None   Final Result      1. Small fluid lower pelvis which may reflect physiologic fluid or may reflect a ruptured ovarian cyst in the proper clinical setting. 2. No ureterolithiasis or hydronephrosis. 3. Small fat-containing umbilical hernia. ASSESSMENT AND PLAN:  Blood loss anemia due to hematuria  CT scan with fluid in abdomen. UA large blood, >100 RBC. Hgb 4.9 on admission. Vitals relatively stable.  Has had hematuria for 4 months.  - Urology consulted for possible cysto  - Ordered 3u pRBC, will monitor H/H q8h  - Transfuse for < 7  - IVF at 100/hr for 5 hours  - Continue to monitor       Will discuss with attending physician Dr. Rimma Theodore Status:Full code  FEN: Diet NPO Effective Now Exceptions are: Sips with Meds, Ice Chips  PPX: None due to bleeding risk  DISPO: IGNACIO Trevino MD  10/5/2020,  12:39 AM

## 2020-10-06 VITALS
RESPIRATION RATE: 16 BRPM | HEART RATE: 72 BPM | HEIGHT: 69 IN | DIASTOLIC BLOOD PRESSURE: 62 MMHG | SYSTOLIC BLOOD PRESSURE: 105 MMHG | WEIGHT: 185.85 LBS | TEMPERATURE: 98.2 F | BODY MASS INDEX: 27.53 KG/M2 | OXYGEN SATURATION: 100 %

## 2020-10-06 LAB
ANION GAP SERPL CALCULATED.3IONS-SCNC: 10 MMOL/L (ref 3–16)
BUN BLDV-MCNC: 7 MG/DL (ref 7–20)
CALCIUM SERPL-MCNC: 8.7 MG/DL (ref 8.3–10.6)
CHLORIDE BLD-SCNC: 107 MMOL/L (ref 99–110)
CO2: 20 MMOL/L (ref 21–32)
CREAT SERPL-MCNC: 0.7 MG/DL (ref 0.6–1.1)
GFR AFRICAN AMERICAN: >60
GFR NON-AFRICAN AMERICAN: >60
GLUCOSE BLD-MCNC: 94 MG/DL (ref 70–99)
HCT VFR BLD CALC: 23.1 % (ref 36–48)
HCT VFR BLD CALC: 23.1 % (ref 36–48)
HEMOGLOBIN: 7.4 G/DL (ref 12–16)
HEMOGLOBIN: 7.5 G/DL (ref 12–16)
POTASSIUM REFLEX MAGNESIUM: 4.1 MMOL/L (ref 3.5–5.1)
SEDIMENTATION RATE, ERYTHROCYTE: 28 MM/HR (ref 0–20)
SODIUM BLD-SCNC: 137 MMOL/L (ref 136–145)
TOTAL CK: 77 U/L (ref 26–192)
URINE CULTURE, ROUTINE: NORMAL

## 2020-10-06 PROCEDURE — 85014 HEMATOCRIT: CPT

## 2020-10-06 PROCEDURE — 80048 BASIC METABOLIC PNL TOTAL CA: CPT

## 2020-10-06 PROCEDURE — 2580000003 HC RX 258: Performed by: STUDENT IN AN ORGANIZED HEALTH CARE EDUCATION/TRAINING PROGRAM

## 2020-10-06 PROCEDURE — 36415 COLL VENOUS BLD VENIPUNCTURE: CPT

## 2020-10-06 PROCEDURE — 85652 RBC SED RATE AUTOMATED: CPT

## 2020-10-06 PROCEDURE — 85018 HEMOGLOBIN: CPT

## 2020-10-06 PROCEDURE — 82550 ASSAY OF CK (CPK): CPT

## 2020-10-06 RX ORDER — LANOLIN ALCOHOL/MO/W.PET/CERES
325 CREAM (GRAM) TOPICAL EVERY OTHER DAY
Qty: 90 TABLET | Refills: 0 | Status: SHIPPED | OUTPATIENT
Start: 2020-10-06

## 2020-10-06 RX ADMIN — SODIUM CHLORIDE, POTASSIUM CHLORIDE, SODIUM LACTATE AND CALCIUM CHLORIDE: 600; 310; 30; 20 INJECTION, SOLUTION INTRAVENOUS at 04:53

## 2020-10-06 ASSESSMENT — PAIN SCALES - GENERAL
PAINLEVEL_OUTOF10: 0
PAINLEVEL_OUTOF10: 0

## 2020-10-06 NOTE — CARE COORDINATION
Case Management Assessment            Discharge Note                    Date / Time of Note: 10/6/2020 2:01 PM                  Discharge Note Completed by: Michael Jett    Patient Name: Roxy Ceballos   YOB: 1990  Diagnosis: Acute anemia [D64.9]   Date / Time: 10/4/2020  7:13 PM    Current PCP: No primary care provider on file. Clinic patient: No    Hospitalization in the last 30 days: No    Advance Directives:  Code Status: Full Code  [place] DNR form completed and on chart: Not Indicated    Financial:  Payor: /      Pharmacy:  No Pharmacies [address] medications?: Potential Assistance Purchasing Medications: No  Assistance provided by Case Management: None at this time    Does patient want to participate in local refill/ meds to beds program?: No    Meds To [place] Rules:  1. Can ONLY be done Monday- Friday between 8:30am-5pm  2. Prescription(s) must be in pharmacy by 3pm to be filled same day  3. Copy of patient's insurance/ prescription drug card and patient face sheet must be sent along with the prescription(s)  4. Cost of Rx cannot be added to hospital bill. If financial assistance is needed, please contact unit  or ;  or  CANNOT provide pharmacy voucher for patients co-pays  5. Patients can then  the prescription on their way out of the hospital at discharge, or pharmacy can deliver to the bedside if staff is available. (payment due at time of pick-up or delivery - cash, check, or card accepted)     Able to afford home medications/ co-pay costs: No    ADLS:  Current PT AM-PAC Score:   /24  Current OT AM-PAC Score:   /24      DISCHARGE Disposition: Home- No Services Needed      Referrals made at [facility] for outpatient continued care:  Not Applicable    Additional CM Notes:   Patient from home, independent pta. No CM needs at this time.     The Plan for Transition of Care is related to the following

## 2020-10-06 NOTE — PROGRESS NOTES
Patient is A&O x3.  RA, sat 100%. No complaints of pain or SOB. Respirations appear to easy and unlabored. Lungs clear. Respirations easy with no complaints of cough. No complaints of nausea/vomiting/diarrhea. Up ad virgen to the bathroom/BSC as needed. Right upper arm PIV intact and flushed. Tolerating regular diet. NPO after midnight. Plan of care and safety measures reviewed with the patient. Call light in reach. Will continue to monitor.   Electronically signed by Cari vAalos RN on 10/5/2020 at 8:34 PM

## 2020-10-06 NOTE — DISCHARGE SUMMARY
INTERNAL MEDICINE DEPARTMENT AT [address] [place] [address]  DISCHARGE SUMMARY    Patient ID: Susan Ham                                             Discharge Date: 10/6/2020   Patient's PCP: No primary care provider on file. Discharge Physician: Oriana Kunz MD  Admit Date: 10/4/2020   Admitting Physician: Hannah Bolanos MD    PROBLEMS DURING HOSPITALIZATION:  Present on Admission:   Acute anemia      DISCHARGE DIAGNOSES:  1. Gross Hematuria of Unknown Etiology  Possible Bladder Etiology vs Possible Glomerular Disease vs Possible Autoimmune vs Possible Sickle Cell Trait   2. Microcytic Anemia    Probable Hematuria vs Probable Iron Deficiency vs Possible Sickle Cell Trait vs Possible GI Blood Loss   3. Acute UTI, possibly complicated    HPI:    The following issues were addressed during hospitalization:    Ms. Susan Ham is a 69-year-old female with PMH sickle cell trait who presented with 4 month history of gross painless hematuria. On the day of admission the patient endorsed feeling extremely fatigued and light headed with visual disturbances which prompted her to come to the hospital. In the ED, she was noted to have a Hgb of 4.9, with vitals remaining relatively stable. Her UA was positive for macroscopic blood and RBC's, as well leukocyte esterase and nitrites with 3+ bacteria. CT scan of the abdomen and pelvis showed small fluid in the lower pelvis with possibility for ruptured ovarian cyst or physiologic fluid. She was admitted to the floor and received 2 units of pRBC's which she tolerated well. In light of a microcytic, iron-deficiency anemia, she was given IV iron. She received IV antibiotics (Rocephin) for treatment of UTI. She was also started on IV Lactated Ringer's fluids. Urology was consulted on 10/5 and recommended CT urogram which was unremarkable.  The plan was for Urology to complete a cystoscopy on 10/6 however the patient refused and indicated she wanted to go home. The primary team counseled the patient on the risks of refusing further work-up, including continued bleeding with further drop in hemoglobin levels which could be fatal. She was also made aware that discharge without management of her acute condition could result in readmission for similar problems in the future. She verbalized understanding of risks and continued to refuse further care. On the day of discharge the patient was afebrile, had a blood pressure of 98/65, pulse of 65 and was breathing at a regular rate. She had a Hgb of 7.4 at 10 am in the morning. She continued to endorse headache, lightheadedness, and was craving ice. During her hospitalization, the patient was uncooperative, refused lab work and further work-up. The patient was scheduled for a follow-up appointment with Urology in 1 week. She was also seen by Nephrology who planned to follow with her outpatient in 2 weeks time. The patient was further encouraged to follow with her PCP. She was given iron pills on discharge. Physical Exam:  /62   Pulse 72   Temp 98.2 °F (36.8 °C)   Resp 16   Ht 5' 9\" (1.753 m)   Wt 185 lb 13.6 oz (84.3 kg)   LMP 09/12/2020   SpO2 100%   BMI 27.44 kg/m²      General - Alert, oriented X3. CVS - RRR, N S1/S2. Lungs - CTA b/l. Strong respiratory effort. Abdomen - Non-distended, non-tender. No rebound, rigidity, guarding. Extremities - No swelling.      Consults: nephrology and urology  Disposition: home  Discharged Condition: Stable  Follow Up: Primary Care Physician in one week    DISCHARGE MEDICATION:     Medication List      START taking these medications    ferrous sulfate 325 (65 Fe) MG EC tablet  Commonly known as:  FE TABS 325  Take 1 tablet by mouth every other day           Where to Get Your Medications      You can get these medications from any pharmacy    Bring a paper prescription for each of these medications  · ferrous sulfate 325 (65 Fe) MG EC tablet       Activity: activity as tolerated  Diet: regular diet  Wound Care: none needed    Time Spent on discharge is more than 45 minutes    Signed:  Jasper Milton MD, PGY-1  10/6/2020

## 2020-10-06 NOTE — PROGRESS NOTES
Urology Attending Progress Note      Subjective: patient feels tired; still with some hematuria    Vitals:  BP 98/65   Pulse 65   Temp 98 °F (36.7 °C) (Oral)   Resp 16   Ht 5' 9\" (1.753 m)   Wt 185 lb 13.6 oz (84.3 kg)   LMP 2020   SpO2 100%   BMI 27.44 kg/m²   Temp  Av.3 °F (36.8 °C)  Min: 98 °F (36.7 °C)  Max: 98.5 °F (36.9 °C)    Intake/Output Summary (Last 24 hours) at 10/6/2020 5110  Last data filed at 10/6/2020 0451  Gross per 24 hour   Intake 1542 ml   Output --   Net 1542 ml       Exam: abdomen - soft, no distension    Labs:  WBC:    Lab Results   Component Value Date    WBC 7.4 10/04/2020     Hemoglobin/Hematocrit:    Lab Results   Component Value Date    HGB 7.5 10/06/2020    HCT 23.1 10/06/2020     BMP:    Lab Results   Component Value Date     10/05/2020    K 4.0 10/05/2020     10/05/2020    CO2 22 10/05/2020    BUN 9 10/05/2020    LABALBU 3.6 10/05/2020    CREATININE 0.7 10/05/2020    CALCIUM 8.2 10/05/2020    GFRAA >60 10/05/2020    LABGLOM >60 10/05/2020     PT/INR:    Lab Results   Component Value Date    PROTIME 12.0 10/05/2020    INR 1.03 10/05/2020     PTT:    Lab Results   Component Value Date    APTT 28.3 10/05/2020   [APTT    Urinalysis:     Urine Culture:      Blood Culture:      Antibiotic Therapy:      Imaging: ct urogram negative other than for small amount pelvic free fluid      Impression/Plan: Gross hematuria  Recommended further evaluation with cystoscopy though ct urogram negative; pt has refused cystoscopy at this time  Told pt if she would reconsider, can call office to schedule - pt given card  Told pt gross hematuria could also be glomerular bleeding and could consider nephrology consult but usually this is done after negative cystoscopy  Will sign off    Jeff Lehman MD

## 2020-10-06 NOTE — PROGRESS NOTES
4 Eyes Skin Assessment     NAME:  Hank Mistry  YOB: 1990  MEDICAL RECORD NUMBER:  [id number]    The patient is being assess for  Transfer to New Unit    I agree that 2 RN's have performed a thorough Head to Toe Skin Assessment on the patient. ALL assessment sites listed below have been assessed. Areas assessed by both nurses:    Head, Face, Ears, Shoulders, Back, Chest, Arms, Elbows, Hands, Sacrum. Buttock, Coccyx, Ischium and Legs. Feet and Heels        Does the Patient have a Wound?  No noted wound(s)       Ashish Prevention initiated:  No   Wound Care Orders initiated:  No    Pressure Injury (Stage 3,4, Unstageable, DTI, NWPT, and Complex wounds) if present place consult order under [de-identified] No    New and Established Ostomies if present place consult order under : No      Nurse 1 eSignature: Electronically signed by Kennedi Dumas RN on 10/5/20 at 10:05 PM EDT    **SHARE this note so that the co-signing nurse is able to place an eSignature**    Nurse 2 eSignature: Electronically signed by Dinora Hampton RN on 10/5/20 at 10:07 PM EDT

## 2020-10-06 NOTE — PROGRESS NOTES
Patient in bed resting comfortably. Respirations steady and unlabored. No signs of respiratory or cardiac distress. No complaints of pain at this time. IVF infusing as ordered. No needs at this time. Call light in reach. Will continue to monitor.   Electronically signed by Kang Powell RN on 10/6/2020 at 1:23 AM

## 2020-10-06 NOTE — CONSULTS
Consult received  Full consult to follow    Patient will be seen as out patient in 2 weeks.     Thanks  Nephrology  [address]  Office: [phone]  Cell: [phone]  Fax: [phone]

## 2020-10-06 NOTE — CONSULTS
[facility] NEPHROLOGY    [facility]              [phone]                      Mr Jim Grijalva is admitted with Hematuria We are following for hematuria work up     Interval History and plan:    34 Y/F No PMH,she has hx of hematuria for the last 3 months, she denies any muscle pain, cough, fever, heompmtesis or hematolysis                    Assessment :      Hematuria:  Pt is refusing Cystoscopy, she is not interested to stay in the hospital, she wants to leave, She refused the morning lab works,   -We order ESR, ANCA, Ant GBM,   -She may needs kidney biopsy later if she keeps having hematuria. -If she decide to leave, she needs to follow up with Nephrology as out patient   -She can follow up with resident clinic. [facility] Nephrology would like to thank Tia Buchanan MD   for opportunity to serve this patient      Please call with questions at-   24 Hrs Answering service [phone] or  7 am- 5 pm via Perfect serve or cell phone  Halley Johnson          CC/reason for consult :     hematuria     HPI :     Hank Mistry is a 34 y.o. female presented to the hospital on 10/4/2020 with 4 months of gross painless hematuria. Patient states symptoms began in June after she lifted a 50 lb bag and felt a pop in her abdomen. Since then she has had gross hematuria with clots every time she voids. She has had no changes in her menses, no increased use of pads or noted vaginal bleeding. Over this time patient has been feeling progressively weaker, lightheaded when standing, and has been craving ice which is new for her. Today she felt extremely light headed and started having visual disturbances which prompted her to go to ED. Patient denies any dark stools, blood in stool, n/v/d, abd pain, dysuria. ROS:     Constitutional: Negative for chills, fatigue and fever. HENT: Negative for congestion. Respiratory: Negative for cough, shortness of breath and wheezing.     Cardiovascular: Negative for chest pain and palpitations. Gastrointestinal: Negative for abdominal distention, abdominal pain, diarrhea, nausea and vomiting. Genitourinary: Positive for hematuria. Negative for dysuria, flank pain, menstrual problem, pelvic pain, vaginal bleeding and vaginal pain. Neurological: Negative for dizziness, syncope, weakness, light-headedness and numbness. PMH/PSH/SH/Family History:   No pmhx/Surgical hx       Medication:     Current Facility-Administered Medications: cefTRIAXone (ROCEPHIN) 1 g IVPB in 50 mL D5W minibag, 1 g, Intravenous, Q24H  sodium chloride flush 0.9 % injection 10 mL, 10 mL, Intravenous, 2 times per day  sodium chloride flush 0.9 % injection 10 mL, 10 mL, Intravenous, PRN  acetaminophen (TYLENOL) tablet 650 mg, 650 mg, Oral, Q6H PRN **OR** acetaminophen (TYLENOL) suppository 650 mg, 650 mg, Rectal, Q6H PRN  polyethylene glycol (GLYCOLAX) packet 17 g, 17 g, Oral, Daily PRN  promethazine (PHENERGAN) tablet 12.5 mg, 12.5 mg, Oral, Q6H PRN **OR** ondansetron (ZOFRAN) injection 4 mg, 4 mg, Intravenous, Q6H PRN       Vitals :     Vitals:    10/06/20 0830   BP: 98/65   Pulse: 65   Resp: 16   Temp: 98 °F (36.7 °C)   SpO2: 100%       I & O :       Intake/Output Summary (Last 24 hours) at 10/6/2020 1011  Last data filed at 10/6/2020 0451  Gross per 24 hour   Intake 1417 ml   Output --   Net 1417 ml        Physical Examination :     Constitutional:       General: She is not in acute distress. Appearance: She is well-developed. She is not diaphoretic. HENT:      Head: Normocephalic and atraumatic. Cardiovascular:      Rate and Rhythm: Normal rate and regular rhythm. Heart sounds: Normal heart sounds. No murmur. Pulmonary:      Effort: Pulmonary effort is normal. No respiratory distress. Breath sounds: Normal breath sounds. No wheezing. Abdominal:      General: Bowel sounds are normal. There is no distension. Palpations: Abdomen is soft. Tenderness:  There

## 2020-10-14 ENCOUNTER — OFFICE VISIT (OUTPATIENT)
Dept: INTERNAL MEDICINE CLINIC | Age: 30
End: 2020-10-14

## 2020-10-14 VITALS
TEMPERATURE: 97.7 F | WEIGHT: 184.7 LBS | BODY MASS INDEX: 27.36 KG/M2 | SYSTOLIC BLOOD PRESSURE: 97 MMHG | HEART RATE: 78 BPM | RESPIRATION RATE: 16 BRPM | HEIGHT: 69 IN | DIASTOLIC BLOOD PRESSURE: 66 MMHG | OXYGEN SATURATION: 100 %

## 2020-10-14 DIAGNOSIS — R31.0 GROSS HEMATURIA: ICD-10-CM

## 2020-10-14 LAB
BASOPHILS ABSOLUTE: 0 K/UL (ref 0–0.2)
BASOPHILS RELATIVE PERCENT: 0.4 %
EOSINOPHILS ABSOLUTE: 0 K/UL (ref 0–0.6)
EOSINOPHILS RELATIVE PERCENT: 0.9 %
HCT VFR BLD CALC: 27.7 % (ref 36–48)
HEMOGLOBIN: 8.7 G/DL (ref 12–16)
LYMPHOCYTES ABSOLUTE: 1.8 K/UL (ref 1–5.1)
LYMPHOCYTES RELATIVE PERCENT: 38.2 %
MCH RBC QN AUTO: 23.2 PG (ref 26–34)
MCHC RBC AUTO-ENTMCNC: 31.5 G/DL (ref 31–36)
MCV RBC AUTO: 73.7 FL (ref 80–100)
MONOCYTES ABSOLUTE: 0.4 K/UL (ref 0–1.3)
MONOCYTES RELATIVE PERCENT: 8.3 %
NEUTROPHILS ABSOLUTE: 2.4 K/UL (ref 1.7–7.7)
NEUTROPHILS RELATIVE PERCENT: 52.2 %
PDW BLD-RTO: 29.4 % (ref 12.4–15.4)
PLATELET # BLD: 327 K/UL (ref 135–450)
PMV BLD AUTO: 9.5 FL (ref 5–10.5)
RBC # BLD: 3.76 M/UL (ref 4–5.2)
SEDIMENTATION RATE, ERYTHROCYTE: 17 MM/HR (ref 0–20)
WBC # BLD: 4.7 K/UL (ref 4–11)

## 2020-10-14 PROCEDURE — 85025 COMPLETE CBC W/AUTO DIFF WBC: CPT

## 2020-10-14 PROCEDURE — 99213 OFFICE O/P EST LOW 20 MIN: CPT | Performed by: STUDENT IN AN ORGANIZED HEALTH CARE EDUCATION/TRAINING PROGRAM

## 2020-10-14 PROCEDURE — 36415 COLL VENOUS BLD VENIPUNCTURE: CPT

## 2020-10-14 ASSESSMENT — ENCOUNTER SYMPTOMS
ABDOMINAL DISTENTION: 0
CHEST TIGHTNESS: 0
COUGH: 0
NAUSEA: 0
TROUBLE SWALLOWING: 0
ABDOMINAL PAIN: 0
RHINORRHEA: 0
SORE THROAT: 0
SINUS PRESSURE: 0
WHEEZING: 0
CONSTIPATION: 0
VOMITING: 0
DIARRHEA: 0
SHORTNESS OF BREATH: 0
BACK PAIN: 0
SINUS PAIN: 0

## 2020-10-14 ASSESSMENT — VISUAL ACUITY: OU: 1

## 2020-10-14 NOTE — LETTER
[address] [place] [address]  Phone: [phone]  Fax: [phone]         October 14, 2020     Patient: Elle Pardo   YOB: 1990   Date of Visit: 10/14/2020       To Whom It May Concern:    Elle Pardo was seen and treated at our clinic on 10/14/2020. She was admitted at The [address] [place] [address] from 10/04/2020 - 10/06/2020. Work recommendations are as follows  - Ms. Ori York return to work at this time given her need for further tests, rest and recuperations. Another letter will be sent with her from [facility] when she is well and able to return to work. That letter will give details of work restrictions if any are deemed necessary at that time. Thank you.       Sincerely,      Signature:__________________________________  Ruby Valdez MD

## 2020-10-14 NOTE — PATIENT INSTRUCTIONS
- Go to the lab to get your blood work done  - I will call you with the results this afternoon to tell you whether or not you need to go to the Emergency Department. - Continue taking Iron tablets as prescribed  - Call urology again and try and get an earlier appointment. Within a week if possible. - Follow up with Nephrology as scheduled. Thank you.

## 2020-10-14 NOTE — PROGRESS NOTES
constipation, diarrhea, nausea and vomiting. Endocrine: Negative for polydipsia, polyphagia and polyuria. Genitourinary: Positive for hematuria. Musculoskeletal: Negative for back pain and joint swelling. Neurological: Positive for weakness and light-headedness. Negative for dizziness, tremors and headaches. Psychiatric/Behavioral: Negative for agitation and decreased concentration. The patient is not nervous/anxious. Prior to Visit Medications    Medication Sig Taking? Authorizing Provider   ferrous sulfate (FE TABS 325) 325 (65 Fe) MG EC tablet Take 1 tablet by mouth every other day Yes Eleanor Ambrose MD        No Known Allergies    No past medical history on file. No past surgical history on file.     Social History     Socioeconomic History    Marital status: Single     Spouse name: Not on file    Number of children: Not on file    Years of education: Not on file    Highest education level: Not on file   Occupational History    Not on file   Social Needs    Financial resource strain: Not on file    Food insecurity     Worry: Not on file     Inability: Not on file    Transportation needs     Medical: Not on file     Non-medical: Not on file   Tobacco Use    Smoking status: Not on file   Substance and Sexual Activity    Alcohol use: Not on file    Drug use: Not on file    Sexual activity: Not on file   Lifestyle    Physical activity     Days per week: Not on file     Minutes per session: Not on file    Stress: Not on file   Relationships    Social connections     Talks on phone: Not on file     Gets together: Not on file     Attends Anabaptist service: Not on file     Active member of club or organization: Not on file     Attends meetings of clubs or organizations: Not on file     Relationship status: Not on file    Intimate partner violence     Fear of current or ex partner: Not on file     Emotionally abused: Not on file     Physically abused: Not on file     Forced sexual activity: Not on file   Other Topics Concern    Not on file   Social History Narrative    Not on file        No family history on file. Vitals:    10/14/20 1039   BP: 97/66   Site: Left Upper Arm   Position: Sitting   Cuff Size: Large Adult   Pulse: 78   Resp: 16   Temp: 97.7 °F (36.5 °C)   SpO2: 100%   Weight: 184 lb 11.2 oz (83.8 kg)   Height: 5' 9\" (1.753 m)     Estimated body mass index is 27.28 kg/m² as calculated from the following:    Height as of this encounter: 5' 9\" (1.753 m). Weight as of this encounter: 184 lb 11.2 oz (83.8 kg). Physical Exam  Constitutional:       General: She is not in acute distress. Appearance: Normal appearance. She is not ill-appearing. HENT:      Head: Normocephalic and atraumatic. Mouth/Throat:      Mouth: Mucous membranes are moist.      Pharynx: Oropharynx is clear. Eyes:      General: Vision grossly intact. Conjunctiva/sclera: Conjunctivae normal.   Neck:      Musculoskeletal: Full passive range of motion without pain, normal range of motion and neck supple. Cardiovascular:      Rate and Rhythm: Normal rate and regular rhythm. Pulses: Normal pulses. Heart sounds: Normal heart sounds, S1 normal and S2 normal. No murmur. No friction rub. No gallop. Pulmonary:      Effort: Pulmonary effort is normal. No respiratory distress. Breath sounds: Normal breath sounds and air entry. No wheezing or rales. Abdominal:      General: Bowel sounds are normal. There is no distension. Palpations: Abdomen is soft. Tenderness: There is no abdominal tenderness. Musculoskeletal: Normal range of motion. Right lower leg: No edema. Left lower leg: No edema. Skin:     General: Skin is warm. Capillary Refill: Capillary refill takes less than 2 seconds. Coloration: Skin is not pale. Neurological:      Mental Status: She is alert and oriented to person, place, and time. Mental status is at baseline.    Psychiatric: Mood and Affect: Mood normal.         Speech: Speech normal.         Judgment: Judgment normal.         ASSESSMENT/PLAN:  1. Gross hematuria  Suspicion for Autoimmune condition involving the kidneys  - CBC Auto Differential STAT: Will call pt this afternoon with results. If Hgb <7, will recommend pt go to the ED for blood transfusion and to complete further f/u. - KANDI Reflex to Antibody Cascade; Future  - SEDIMENTATION RATE; Future  - F/u with Urology. Has an appointment in November. Advised to call tot make an earlier appoinment  - F/u with Nephrology. Has an appointment in October. The following were ordered during hospitalization but not done. Reordered  - GLOMERULAR BASEMENT MEMBRANE (GBM) ANTIBODY IGG; Future  - ANTI-NEUTROPHILIC CYTOPLASMIC ANTIBODY; Future      Return in about 2 weeks (around 10/28/2020). An  electronic signature was used to authenticate this note.     --Malachi Tayolr MD on 10/14/2020 at 11:53 AM

## 2020-10-14 NOTE — LETTER
[address] Corey Cervantes [facility] [address]  Phone: [phone]  Fax: [phone]         October 14, 2020     Patient: Zulay Pina   YOB: 1990   Date of Visit: 10/14/2020       To Whom It May Concern:    Zulay Pina was seen and treated in our outpatient clinic on 10/14/2020. The following work duties are recommended. She may return to work on or after 10/17/2020 with the following restrictions:   - Light duty only. - Lifting/carrying not to exceed 5-10lbs, pushing/pulling not to exceed 5-10lbs. If her condition is to worsen she will have stop working until she can be reassessed by a doctor. Thank you.     Sincerely,        Signature:__________________________________  Sen Allen MD

## 2020-10-15 LAB — ANTI-NUCLEAR ANTIBODY (ANA): NEGATIVE

## 2020-10-16 LAB
ANCA IFA: NORMAL
BETA GLOBULIN: 1 AU/ML (ref 0–19)

## 2022-05-02 ENCOUNTER — HOSPITAL ENCOUNTER (EMERGENCY)
Age: 32
Discharge: HOME | End: 2022-05-02
Attending: EMERGENCY MEDICINE

## 2022-05-02 ENCOUNTER — APPOINTMENT (OUTPATIENT)
Dept: GENERAL RADIOLOGY | Age: 32
End: 2022-05-02

## 2022-05-02 VITALS
HEIGHT: 69 IN | SYSTOLIC BLOOD PRESSURE: 135 MMHG | OXYGEN SATURATION: 100 % | TEMPERATURE: 98.1 F | BODY MASS INDEX: 27.28 KG/M2 | RESPIRATION RATE: 14 BRPM | DIASTOLIC BLOOD PRESSURE: 98 MMHG | HEART RATE: 66 BPM

## 2022-05-02 DIAGNOSIS — F43.0 PANIC ATTACK AS REACTION TO STRESS: ICD-10-CM

## 2022-05-02 DIAGNOSIS — F41.1 ANXIETY STATE: Primary | ICD-10-CM

## 2022-05-02 DIAGNOSIS — F41.0 PANIC ATTACK AS REACTION TO STRESS: ICD-10-CM

## 2022-05-02 LAB
ANION GAP SERPL CALCULATED.3IONS-SCNC: 14 MMOL/L (ref 3–16)
BASOPHILS ABSOLUTE: 0 K/UL (ref 0–0.2)
BASOPHILS RELATIVE PERCENT: 0.6 %
BUN BLDV-MCNC: 19 MG/DL (ref 7–20)
CALCIUM SERPL-MCNC: 9.7 MG/DL (ref 8.3–10.6)
CHLORIDE BLD-SCNC: 104 MMOL/L (ref 99–110)
CO2: 19 MMOL/L (ref 21–32)
CREAT SERPL-MCNC: 0.8 MG/DL (ref 0.6–1.1)
EKG ATRIAL RATE: 91 BPM
EKG DIAGNOSIS: NORMAL
EKG P AXIS: 74 DEGREES
EKG P-R INTERVAL: 186 MS
EKG Q-T INTERVAL: 354 MS
EKG QRS DURATION: 78 MS
EKG QTC CALCULATION (BAZETT): 435 MS
EKG R AXIS: 69 DEGREES
EKG T AXIS: 63 DEGREES
EKG VENTRICULAR RATE: 91 BPM
EOSINOPHILS ABSOLUTE: 0 K/UL (ref 0–0.6)
EOSINOPHILS RELATIVE PERCENT: 0.7 %
GFR AFRICAN AMERICAN: >60
GFR NON-AFRICAN AMERICAN: >60
GLUCOSE BLD-MCNC: 102 MG/DL (ref 70–99)
HCT VFR BLD CALC: 34.4 % (ref 36–48)
HEMOGLOBIN: 11.7 G/DL (ref 12–16)
LYMPHOCYTES ABSOLUTE: 2.2 K/UL (ref 1–5.1)
LYMPHOCYTES RELATIVE PERCENT: 38.3 %
MCH RBC QN AUTO: 29.4 PG (ref 26–34)
MCHC RBC AUTO-ENTMCNC: 34.1 G/DL (ref 31–36)
MCV RBC AUTO: 86.3 FL (ref 80–100)
MONOCYTES ABSOLUTE: 0.3 K/UL (ref 0–1.3)
MONOCYTES RELATIVE PERCENT: 6 %
NEUTROPHILS ABSOLUTE: 3.1 K/UL (ref 1.7–7.7)
NEUTROPHILS RELATIVE PERCENT: 54.4 %
PDW BLD-RTO: 13.2 % (ref 12.4–15.4)
PLATELET # BLD: 233 K/UL (ref 135–450)
PMV BLD AUTO: 9.7 FL (ref 5–10.5)
POTASSIUM REFLEX MAGNESIUM: 3.8 MMOL/L (ref 3.5–5.1)
RBC # BLD: 3.99 M/UL (ref 4–5.2)
SODIUM BLD-SCNC: 137 MMOL/L (ref 136–145)
TROPONIN: <0.01 NG/ML
WBC # BLD: 5.6 K/UL (ref 4–11)

## 2022-05-02 PROCEDURE — 96374 THER/PROPH/DIAG INJ IV PUSH: CPT

## 2022-05-02 PROCEDURE — 6370000000 HC RX 637 (ALT 250 FOR IP): Performed by: STUDENT IN AN ORGANIZED HEALTH CARE EDUCATION/TRAINING PROGRAM

## 2022-05-02 PROCEDURE — 36415 COLL VENOUS BLD VENIPUNCTURE: CPT

## 2022-05-02 PROCEDURE — 80048 BASIC METABOLIC PNL TOTAL CA: CPT

## 2022-05-02 PROCEDURE — 85025 COMPLETE CBC W/AUTO DIFF WBC: CPT

## 2022-05-02 PROCEDURE — 93005 ELECTROCARDIOGRAM TRACING: CPT | Performed by: STUDENT IN AN ORGANIZED HEALTH CARE EDUCATION/TRAINING PROGRAM

## 2022-05-02 PROCEDURE — 99285 EMERGENCY DEPT VISIT HI MDM: CPT

## 2022-05-02 PROCEDURE — 6360000002 HC RX W HCPCS: Performed by: STUDENT IN AN ORGANIZED HEALTH CARE EDUCATION/TRAINING PROGRAM

## 2022-05-02 PROCEDURE — 84484 ASSAY OF TROPONIN QUANT: CPT

## 2022-05-02 PROCEDURE — 2580000003 HC RX 258: Performed by: STUDENT IN AN ORGANIZED HEALTH CARE EDUCATION/TRAINING PROGRAM

## 2022-05-02 PROCEDURE — 71046 X-RAY EXAM CHEST 2 VIEWS: CPT

## 2022-05-02 RX ORDER — LIDOCAINE 4 G/G
1 PATCH TOPICAL ONCE
Status: DISCONTINUED | OUTPATIENT
Start: 2022-05-02 | End: 2022-05-02 | Stop reason: HOSPADM

## 2022-05-02 RX ORDER — LORAZEPAM 2 MG/ML
1 INJECTION INTRAMUSCULAR ONCE
Status: COMPLETED | OUTPATIENT
Start: 2022-05-02 | End: 2022-05-02

## 2022-05-02 RX ORDER — SODIUM CHLORIDE, SODIUM LACTATE, POTASSIUM CHLORIDE, AND CALCIUM CHLORIDE .6; .31; .03; .02 G/100ML; G/100ML; G/100ML; G/100ML
500 INJECTION, SOLUTION INTRAVENOUS ONCE
Status: COMPLETED | OUTPATIENT
Start: 2022-05-02 | End: 2022-05-02

## 2022-05-02 RX ADMIN — SODIUM CHLORIDE, SODIUM LACTATE, POTASSIUM CHLORIDE, AND CALCIUM CHLORIDE 500 ML: .6; .31; .03; .02 INJECTION, SOLUTION INTRAVENOUS at 17:13

## 2022-05-02 RX ADMIN — LORAZEPAM 1 MG: 2 INJECTION INTRAMUSCULAR; INTRAVENOUS at 16:39

## 2022-05-02 ASSESSMENT — ENCOUNTER SYMPTOMS
SHORTNESS OF BREATH: 1
DIARRHEA: 0
COUGH: 0
NAUSEA: 0
BACK PAIN: 0
VOMITING: 0
CONSTIPATION: 0
WHEEZING: 0
ABDOMINAL PAIN: 0

## 2022-05-02 ASSESSMENT — HEART SCORE: ECG: 0

## 2022-05-02 ASSESSMENT — PAIN - FUNCTIONAL ASSESSMENT: PAIN_FUNCTIONAL_ASSESSMENT: 0-10

## 2022-05-02 ASSESSMENT — PAIN DESCRIPTION - FREQUENCY: FREQUENCY: CONTINUOUS

## 2022-05-02 ASSESSMENT — PAIN SCALES - GENERAL: PAINLEVEL_OUTOF10: 9

## 2022-05-02 ASSESSMENT — PAIN DESCRIPTION - LOCATION: LOCATION: CHEST

## 2022-05-02 NOTE — ED TRIAGE NOTES
Pt brought in by squad for chest pain. Was at work and was stressed out, started having chest pain and felt dizzy.  Pt is upset and tachypniec upon arrival, stating she feels anxious

## 2022-05-02 NOTE — Clinical Note
fluids

## 2022-05-02 NOTE — ED PROVIDER NOTES
ED Attending Attestation Note     Date of evaluation: 5/2/2022    This patient was seen by the resident. I have seen and examined the patient, agree with the workup, evaluation, management and diagnosis. The care plan has been discussed. I have reviewed the ECG and concur with the resident's interpretation. My assessment reveals an anxious appearing 66-year-old female patient. She has been under a lot of stress at work. She has what she describes as a panic attack approximately an hour prior to presentation. She is tachypneic but is not tachycardic. EKG is nonischemic. She is negative for pulmonary embolism based on PERC criteria. .     Reny Mariscal MD  05/02/22 9920

## 2022-05-02 NOTE — ED PROVIDER NOTES
[address] [facility] RESIDENT NOTE       Date of evaluation: 5/2/2022    Chief Complaint     Panic Attack (brought in for chest pain)    History of Present Illness     Myna Phoenix is a 32 y.o. female who presents with chest pain. Patient has moved from [place] about 2 years ago and does not have any family here which she feels contributes to her stress. Patient states that she recently started a new job as a  and has been very anxious at work. This morning she did not have anything to eat or drink. About an hour ago she began to feel lightheaded and then developed a poking pain in the middle of her chest that does not radiate anywhere and is associated with shortness of breath sensation. No similar episodes in the past. She called 911 was brought to the ED. Review of Systems     Review of Systems   Constitutional: Negative for chills, fatigue and fever. Respiratory: Positive for shortness of breath. Negative for cough and wheezing. Cardiovascular: Positive for chest pain. Negative for palpitations and leg swelling. Gastrointestinal: Negative for abdominal pain, constipation, diarrhea, nausea and vomiting. Endocrine: Negative for polyuria. Genitourinary: Negative for dysuria, flank pain and frequency. Musculoskeletal: Negative for back pain. Skin: Negative for rash and wound. Neurological: Positive for light-headedness. Negative for numbness and headaches. Psychiatric/Behavioral: Negative for agitation and confusion. Past Medical, Surgical, Family, and Social History     She has no past medical history on file. She has no past surgical history on file. Her family history is not on file. She reports that she has been smoking cigarettes. She has been smoking about 1.00 pack per day. She has never used smokeless tobacco. She reports current alcohol use. She reports previous drug use.     Medications     Previous Medications FERROUS SULFATE (FE TABS 325) 325 (65 FE) MG EC TABLET    Take 1 tablet by mouth every other day     Allergies     She has No Known Allergies. Physical Exam     INITIAL VITALS: BP: (!) 113/94, Temp: 98.1 °F (36.7 °C), Pulse: 92, Resp: (!) 31, SpO2: 100 %   Physical Exam  Constitutional:       Appearance: Normal appearance. Comments: Tearful and appears very anxious   HENT:      Head: Normocephalic and atraumatic. Cardiovascular:      Rate and Rhythm: Normal rate and regular rhythm. Pulmonary:      Breath sounds: Normal breath sounds. No wheezing, rhonchi or rales. Abdominal:      Palpations: Abdomen is soft. Tenderness: There is no abdominal tenderness. There is no guarding or rebound. Musculoskeletal:         General: No swelling. Skin:     General: Skin is warm and dry. Findings: No bruising or erythema. Neurological:      Mental Status: She is alert and oriented to person, place, and time. Sensory: No sensory deficit. Motor: No weakness. Diagnostic Results     RADIOLOGY:  XR CHEST (2 VW)   Final Result   Impression: No acute cardiopulmonary abnormality.         LABS:   Results for orders placed or performed during the hospital encounter of 05/02/22   CBC with Auto Differential   Result Value Ref Range    WBC 5.6 4.0 - 11.0 K/uL    RBC 3.99 (L) 4.00 - 5.20 M/uL    Hemoglobin 11.7 (L) 12.0 - 16.0 g/dL    Hematocrit 34.4 (L) 36.0 - 48.0 %    MCV 86.3 80.0 - 100.0 fL    MCH 29.4 26.0 - 34.0 pg    MCHC 34.1 31.0 - 36.0 g/dL    RDW 13.2 12.4 - 15.4 %    Platelets 933 523 - 395 K/uL    MPV 9.7 5.0 - 10.5 fL    Neutrophils % 54.4 %    Lymphocytes % 38.3 %    Monocytes % 6.0 %    Eosinophils % 0.7 %    Basophils % 0.6 %    Neutrophils Absolute 3.1 1.7 - 7.7 K/uL    Lymphocytes Absolute 2.2 1.0 - 5.1 K/uL    Monocytes Absolute 0.3 0.0 - 1.3 K/uL    Eosinophils Absolute 0.0 0.0 - 0.6 K/uL    Basophils Absolute 0.0 0.0 - 0.2 K/uL   Basic Metabolic Panel w/ Reflex to MG   Result Value Ref Range    Sodium 137 136 - 145 mmol/L    Potassium reflex Magnesium 3.8 3.5 - 5.1 mmol/L    Chloride 104 99 - 110 mmol/L    CO2 19 (L) 21 - 32 mmol/L    Anion Gap 14 3 - 16    Glucose 102 (H) 70 - 99 mg/dL    BUN 19 7 - 20 mg/dL    CREATININE 0.8 0.6 - 1.1 mg/dL    GFR Non-African American >60 >60    GFR African American >60 >60    Calcium 9.7 8.3 - 10.6 mg/dL   Troponin   Result Value Ref Range    Troponin <0.01 <0.01 ng/mL   EKG 12 Lead   Result Value Ref Range    Ventricular Rate 91 BPM    Atrial Rate 91 BPM    P-R Interval 186 ms    QRS Duration 78 ms    Q-T Interval 354 ms    QTc Calculation (Bazett) 435 ms    P Axis 74 degrees    R Axis 69 degrees    T Axis 63 degrees    Diagnosis       EKG performed in ER and to be interpreted by ER physician. Confirmed by MD, ER ([id number]),  Radha Stewart ([id number]) on 5/2/2022 4:45:07 PM       RECENT VITALS:  BP: (!) 135/98, Temp: 98.1 °F (36.7 °C),Pulse: 66, Resp: 14, SpO2: 100 %     ED Course     Nursing Notes, Past Medical Hx, Past Surgical Hx, Social Hx, Allergies, and FamilyHx were reviewed. The patient was giventhe following medications:  Orders Placed This Encounter   Medications    lidocaine 4 % external patch 1 patch    LORazepam (ATIVAN) injection 1 mg    lactated ringers bolus     CONSULTS:  None    MEDICAL DECISION MAKING / ASSESSMENT / Valentino Boby is a 32 y.o. female presenting with chest pain. HR is 92 and is hemodynamically stable. Patient appears very anxious and tearful with rapid breathing. Cardiac and lung exam are within normal limits. Sternum is very tender to palpation. No swelling of the extremities. Patient does not have any cardiac risk factors, recent surgery, recent travel, history of prior DVTs. Given the recent stress and anxiety related to work and being away from her family, I suspect that this is likely a panic attack. EKG without ischemic changes. CBC and BMP are within normal limits and troponin is negative. Patient was given a lidocaine patch to apply to the chest.  Was also given  1 mg of Ativan for anxiety and 500mL LR bolus. Symptoms have improved. Vitals are stable. I believe patient is stable for discharge. Discussed findings with patient and that symptoms are most likely related to her anxiety, which patient agrees with. Tells me that she is under a lot of stress at work and we discussed ways that can help reduce her stress as well as breathing exercises. Instructed her to follow up with PCP outpatient. This patient was also evaluated by the attending physician. All care plans were discussed and agreed upon. Clinical Impression     1. Anxiety state    2.  Panic attack as reaction to stress      Disposition     PATIENT REFERRED TO:  Yuliet Gonzalez MD  [address]  [phone]    In 1 week  hospital follow up      DISCHARGE MEDICATIONS:  New Prescriptions    No medications on file       [address] [facility],   Resident  05/02/22 [address], DO  Resident  05/02/22 6994